# Patient Record
Sex: MALE | Race: WHITE
[De-identification: names, ages, dates, MRNs, and addresses within clinical notes are randomized per-mention and may not be internally consistent; named-entity substitution may affect disease eponyms.]

---

## 2018-01-09 ENCOUNTER — HOSPITAL ENCOUNTER (INPATIENT)
Dept: HOSPITAL 74 - YASAS | Age: 28
LOS: 5 days | Discharge: HOME | DRG: 773 | End: 2018-01-14
Attending: INTERNAL MEDICINE | Admitting: INTERNAL MEDICINE
Payer: COMMERCIAL

## 2018-01-09 VITALS — BODY MASS INDEX: 23.1 KG/M2

## 2018-01-09 DIAGNOSIS — F11.23: Primary | ICD-10-CM

## 2018-01-09 DIAGNOSIS — F17.210: ICD-10-CM

## 2018-01-09 DIAGNOSIS — F33.9: ICD-10-CM

## 2018-01-09 DIAGNOSIS — F14.20: ICD-10-CM

## 2018-01-09 DIAGNOSIS — F34.1: ICD-10-CM

## 2018-01-09 DIAGNOSIS — J45.22: ICD-10-CM

## 2018-01-09 LAB
APPEARANCE UR: CLEAR
BILIRUB UR STRIP.AUTO-MCNC: NEGATIVE MG/DL
COLOR UR: (no result)
KETONES UR QL STRIP: NEGATIVE
LEUKOCYTE ESTERASE UR QL STRIP.AUTO: NEGATIVE
NITRITE UR QL STRIP: NEGATIVE
PH UR: 5 [PH] (ref 5–8)
PROT UR QL STRIP: NEGATIVE
PROT UR QL STRIP: NEGATIVE
RBC # UR STRIP: NEGATIVE /UL
SP GR UR: 1.02 (ref 1–1.03)
UROBILINOGEN UR STRIP-MCNC: NEGATIVE MG/DL (ref 0.2–1)

## 2018-01-09 PROCEDURE — HZ2ZZZZ DETOXIFICATION SERVICES FOR SUBSTANCE ABUSE TREATMENT: ICD-10-PCS | Performed by: INTERNAL MEDICINE

## 2018-01-09 RX ADMIN — Medication SCH MG: at 22:30

## 2018-01-09 NOTE — HP
COWS





- Scale


Resting Pulse: 0= MA 80 or Below


Sweatin= Chills/Flushing


Restless Observation: 1= Difficult to Sit Still


Pupil Size: 0= Normal to Room Light


Bone or Joint Aches: 2= Severe Diffuse Aches


Runny Nose/ Eye Tearin= Runny Nose/Eyes


GI Upset > 30mins: 2= Nausea/Diarrhea


Tremor Observation: 2= Slight Tremor Visible


Yawning Observation: 2= >3x During Session


Anxiety or Irritability: 2=Irritable/Anxious


Goose Flesh Skin: 0=Smooth Skin


COWS Score: 14





Admission [facility]





- [facility]


Chief Complaint: 





WITHDRAWAL SX


Allergies/Adverse Reactions: 


 Allergies











Allergy/AdvReac Type Severity Reaction Status Date / Time


 


No Known Allergies Allergy   Verified 18 16:31











History of Present Illness: 





27 YEARS OLD MALE WITH LONG HISTORY OF HEROIN NICOTINE DEPENDENCE HAS ASTHMA 

AND DEPRESSION IS ADMITTED TO DETOX


Exam Limitations: No Limitations





- Ebola screening


Have you traveled outside of the country in the last 21 days: No (N)


Have you had contact with anyone from an Ebola affected area: No


Have you been sick,other than usual withdrawal symptoms: No


Do you have a fever: No





- Review of Systems


Constitutional: Loss of Appetite, Changes in sleep, Unintentional Wgt. Loss, 

Unexplained wgt Loss


EENT: reports: No Symptoms Reported


Respiratory: reports: No Symptoms reported


Cardiac: reports: No Symptoms Reported


GI: reports: Nausea, Poor Appetite, Poor Fluid Intake, Abdominal cramping


: reports: No Symptoms Reported


Musculoskeletal: reports: Back Pain, Joint Pain, Muscle Pain, Neck Pain


Integumentary: reports: Change in Color (IV HEROIN INNER ELBOWS)


Neuro: reports: Tremors


Endocrine: reports: No Symptoms Reported


Hematology: reports: No Symptoms Reported


Psychiatric: reports: Judgement Intact, Orientated x3, Anxious, Depressed


Other Systems: Reviewed and Negative





Patient History





- Patient Medical History


Hx Anemia: No


Hx Asthma: Yes


Hx Chronic Obstructive Pulmonary Disease (COPD): No


Hx Cancer: No


Hx Cardiac Disorders: No


Hx Congestive Heart Failure: No


Hx Hypertension: No


Hx Hypercholesterolemia: No


Hx Pacemaker: No


HX Cerebrovascular Accident: No


Hx Seizures: No


Hx Dementia: No


Hx Diabetes: No


Hx Gastrointestinal Disorders: No


Hx Liver Disease: No


Hx Genitourinary Disorders: No


Hx Sexually Transmitted Disorders: No


Hx Renal Disease (ESRD): No


Hx Thyroid Disease: No


Hx Human Immunodeficiency Virus (HIV): No


Hx Hepatitis C: No


Hx Depression: Yes


Hx Suicide Attempt: No


Hx Bipolar Disorder: No


Hx Schizophrenia: No





- Patient Surgical History


Past Surgical History: No





- PPD History


Previous Implant?: Yes


Documented Results: Negative w/o proof


Implanted On Prior [facility] Admission?: No


PPD to be Administered?: Yes





- Smoking Cessation


Smoking history: Current every day smoker


Have you smoked in the past 12 months: Yes


Aproximately how many cigarettes per day: 20


Cigars Per Day: 0


Hx Chewing Tobacco Use: No


Initiated information on smoking cessation: Yes


'Breaking Loose' booklet given: 18





- Substance & Tx. History


Hx Alcohol Use: Yes


Hx Substance Use: Yes


Substance Use Type: Cocaine, Heroin, Marijuana


Hx Substance Use Treatment: Yes (2016)





- Substances Abused


  ** Heroin


Route: Injection


Frequency: Daily


Amount used: 4-5 BAGS


Age of first use: 19


Date of Last Use: 18





  ** Cocaine


Route: Injection


Frequency: Daily


Amount used: 2-3 bags


Age of first use: 16


Date of Last Use: 18





  ** Alcohol


Route: Oral


Frequency: 3-6 times per week


Amount used: 8-9 [id number]


Age of first use: 12


Date of Last Use: 18





Admission Physical Exam [facility]





- Vital Signs


Vital Signs: 


 Vital Signs - 24 hr











  18





  14:29


 


Temperature 98.9 F


 


Pulse Rate 80


 


Respiratory 18





Rate 


 


Blood Pressure 100/60














- Physical


General Appearance: Yes: Appropriately Dressed, Mild Distress, Thin, Tremorous, 

Irritable, Sweating, Anxious


HEENTM: Yes: Hearing grossly Normal, Normal ENT Inspection, Normocephalic, 

Normal Voice


Respiratory: Yes: Chest Non-Tender, No Respiratory Distress, No Accessory 

Muscle Use, Wheezing


Neck: Yes: Supple, Trachea in good position


Breast: Yes: Breasts Symetrical


Cardiology: Yes: Regular Rhythm, Regular Rate, S1, S2


Abdominal: Yes: Non Tender, Flat, Increased Bowel Sounds


Genitourinary: Yes: Within Normal Limits


Back: Yes: Normal Inspection


Musculoskeletal: Yes: full range of Motion, Gait Steady, Back pain, Muscle Pain


Extremities: Yes: Normal Range of Motion, Non-Tender, Tremors


Neurological: Yes: Fully Oriented, Alert, Motor Strength 5/5, Normal Response, 

Depressed Affect


Integumentary: Yes: Warm, Track Marks


Lymphatic: Yes: Within Normal Limits





- Diagnostic


(1) Opioid dependence with withdrawal


Current Visit: Yes   Status: Acute   





(2) Nicotine dependence


Current Visit: Yes   Status: Acute   


Qualifiers: 


   Nicotine product type: cigarettes   Substance use status: in withdrawal   

Qualified Code(s): F17.213 - Nicotine dependence, cigarettes, with withdrawal   





(3) Asthma


Current Visit: Yes   Status: Chronic   


Qualifiers: 


   Asthma severity: mild   Asthma persistence: intermittent   Asthma 

complication type: with status asthmaticus   Qualified Code(s): J45.22 - Mild 

intermittent asthma with status asthmaticus   





(4) Depression (emotion)


Current Visit: Yes   Status: Suspected   


Qualifiers: 


   Depression Type: dysthymia   Qualified Code(s): F34.1 - Dysthymic disorder   





Cleared for Admission [facility]





- Detox or Rehab


[facility] Level of Care: Medically Managed


Detox Regimen/Protocol: Methadone





[facility] Breath Alcohol Content


Breath Alcohol Content: 0.097





Urine Drug Screen





- Results


Drug Screen Negative: No


Urine Drug Screen Results: THC-Marijuana, VIVEK-Cocaine, OPI-Opiates

## 2018-01-10 LAB
ALBUMIN SERPL-MCNC: 3.3 G/DL (ref 3.4–5)
ALP SERPL-CCNC: 54 U/L (ref 45–117)
ALT SERPL-CCNC: 57 U/L (ref 12–78)
ANION GAP SERPL CALC-SCNC: 8 MMOL/L (ref 8–16)
AST SERPL-CCNC: 37 U/L (ref 15–37)
BILIRUB SERPL-MCNC: 0.3 MG/DL (ref 0.2–1)
BUN SERPL-MCNC: 13 MG/DL (ref 7–18)
CALCIUM SERPL-MCNC: 8.4 MG/DL (ref 8.5–10.1)
CHLORIDE SERPL-SCNC: 105 MMOL/L (ref 98–107)
CO2 SERPL-SCNC: 28 MMOL/L (ref 21–32)
CREAT SERPL-MCNC: 0.8 MG/DL (ref 0.7–1.3)
DEPRECATED RDW RBC AUTO: 13.1 % (ref 11.9–15.9)
GLUCOSE SERPL-MCNC: 79 MG/DL (ref 74–106)
HCT VFR BLD CALC: 42.5 % (ref 35.4–49)
HGB BLD-MCNC: 13.9 GM/DL (ref 11.7–16.9)
MCH RBC QN AUTO: 29.9 PG (ref 25.7–33.7)
MCHC RBC AUTO-ENTMCNC: 32.7 G/DL (ref 32–35.9)
MCV RBC: 91.5 FL (ref 80–96)
PLATELET # BLD AUTO: 264 K/MM3 (ref 134–434)
PMV BLD: 7.9 FL (ref 7.5–11.1)
POTASSIUM SERPLBLD-SCNC: 3.9 MMOL/L (ref 3.5–5.1)
PROT SERPL-MCNC: 6.3 G/DL (ref 6.4–8.2)
RBC # BLD AUTO: 4.64 M/MM3 (ref 4–5.6)
SODIUM SERPL-SCNC: 141 MMOL/L (ref 136–145)
WBC # BLD AUTO: 5.3 K/MM3 (ref 4–10)

## 2018-01-10 RX ADMIN — Medication SCH MG: at 22:37

## 2018-01-10 RX ADMIN — NICOTINE SCH MG: 21 PATCH TRANSDERMAL at 10:16

## 2018-01-10 RX ADMIN — Medication SCH TAB: at 10:14

## 2018-01-10 NOTE — EKG
Test Reason : 

Blood Pressure : ***/*** mmHG

Vent. Rate : 065 BPM     Atrial Rate : 065 BPM

   P-R Int : 142 ms          QRS Dur : 090 ms

    QT Int : 400 ms       P-R-T Axes : 048 051 055 degrees

   QTc Int : 416 ms

 

NORMAL SINUS RHYTHM

NORMAL ECG

NO PREVIOUS ECGS AVAILABLE

Confirmed by OWEN DUPREE, FANTASMA ([id number]) on 1/10/2018 9:38:24 AM

 

Referred By:             Confirmed By:FANTASMA WEAVER MD

## 2018-01-10 NOTE — CONSULT
[facility] Psychiatric Consult





- Data


Date of interview: 01/10/18


Admission source: [facility]


Identifying data: Pt. is a 27 year old male, single, without kids, and 

currently working. This is patient's first admission to [facility]. Pt. admitted 

to  for heroin dependence.


Substance Abuse History: Following information confirmed with Mr. Dee:  

Smoking Cessation.  Smoking history: Current every day smoker.  Have you smoked 

in the past 12 months: Yes.  Aproximately how many cigarettes per day: 20.  

Cigars Per Day: 0.  Hx Chewing Tobacco Use: No.  Initiated information on 

smoking cessation: Yes.  'Breaking Loose' booklet given: 01/09/18.  - Substance 

& Tx. History.  Hx Alcohol Use: Yes.  Hx Substance Use: Yes.  Substance Use Type

: Cocaine, Heroin, Marijuana.  Hx Substance Use Treatment: Yes (2016).  - 

Substances Abused.  ** Heroin.  Route: Injection.  Frequency: Daily.  Amount 

used: 4-5 BAGS.  Age of first use: 19.  Date of Last Use: 01/09/18.  ** 

Cocaine.  Route: Injection.  Frequency: Daily.  Amount used: 2-3 bags.  Age of 

first use: 16.  Date of Last Use: 01/08/18.  ** Alcohol.  Route: Oral.  

Frequency: 3-6 times per week.  Amount used: 8-9 [id number].  Age of first use: 

12.  Date of Last Use: 01/09/18


Medical History: Asthma.


Psychiatric History: Pt. presented as sad and depressed. Pt. assessed by 

psychiatric nurse practitioner and asked if he was endorsing any thoughts to 

hurt himself. Pt. hesitant and tearful. Then stated, " I''m having suicidal 

ideation." Pt. denies having a plan but continues to endorse suicidal ideation. 

Pt. then stated, " "What am i going to do here. Hang myself?  i'm not going to 

do anything but i am having suicidal ideation." Pt. reports having difficulty 

with soberity and his depression. Stating to writer, " I just need to stop 

using drugs but i can't. I get depressed and i begin to use again."  Due to 

active suicidal ideation patient will be placed on 1:1 observation.  Pt. denies 

h/o psychiatric hospitalizations. Reports one suicide attempt at 22 years of 

age via overdose of heroin. Pt. reports seeing a psychiatrist while in rehab at 

[facility] in [place] ( February 2017) and was started on wellbutrin. States he 

last took wellbutrin in april of 2017. Pt. requesting to restart wellbutrin. 

Pt. reports a diagnosis of major depressive disorder.


Physical/Sexual Abuse/Trauma History: Denies.





Mental Status Exam





- Mental Status Exam


Alert and Oriented to: Time, Place, Person


Cognitive Function: Good


Patient Appearance: Well Groomed


Mood: Depressed, Sad


Affect: Mood Congruent


Patient Behavior: Crying (Pt. tearful. ), Cooperative


Speech Pattern: Clear


Voice Loudness: Normal


Thought Process: Goal Oriented


Thought Disorder: Not Present


Hallucinations: Denies


Suicidal Ideation: Current (Pt. currently endorsing suicidal ideation but has 

no plan. )


Homicidal Ideation: Denies


Insight/Judgement: Poor


Sleep: Poorly


Appetite: Fair


Muscle strength/Tone: Normal


Gait/Station: Normal





Psychiatric Findings





- Problem List (Axis 1, 2,3)


(1) Opioid dependence with withdrawal


Current Visit: Yes   Status: Acute   





(2) MDD (major depressive disorder)


Current Visit: Yes   Status: Acute   Comment: Pt. is depressed, affect is 

constricted,  and self reports a diagnosis of depression.   





(3) Nicotine dependence


Current Visit: Yes   Status: Acute   


Qualifiers: 


   Nicotine product type: cigarettes   Substance use status: in withdrawal   

Qualified Code(s): F17.213 - Nicotine dependence, cigarettes, with withdrawal   





(4) Cocaine dependence


Current Visit: Yes   Status: Acute   





- Initial Treatment Plan


Initial Treatment Plan: Psychoeducation provided. Detoxification in progress. 

Pt. reported to writer that he is currently endorsing active suicidal ideation. 

Denies having a specific plan. Review of patient's history consist of one 

suicide attempt via overdose of heroin at the age of 22 which is indicative of 

impulsive behavior. Pt. is able to mention his family members as his protective 

factors. At this time patient can be managed in this setting and as a 

precaution patient is placed on 1:1 observation for safety.  Wellbutrin 150XL 

po daily +Seroquel 50mg qhs  for insomnia to be ordered. Verbal consent given. 

Benefits and side effects discussed. Will continue to monitor.

## 2018-01-10 NOTE — PN
[facility] COWS





- Scale


Resting Pulse: 1= OR 


Sweatin= Chills/Flushing


Restless Observation: 3= Extraneous Movement


Pupil Size: 2= Moderately Dilated


Bone or Joint Aches: 4=Acute Joint/Muscle Pain


Runny Nose/ Eye Tearin= None


GI Upset > 30mins: 1= Stomach Cramp


Tremor Observation of Outstretched Hands: 1= Tremor Felt, Not Seen


Yawning Observation: 1= 1-2x During Session


Anxiety or Irritability: 2=Irritable/Anxious


Goose Flesh Skin: 0=Smooth Skin


COWS Score: 16





[facility] Progress Note (SOAP)


Subjective: 





ANXIETY,SWEATS,TREMORS,FATIGUE.


Objective: 





01/10/18 13:15


 Vital Signs











Temperature  98.5 F   01/10/18 13:03


 


Pulse Rate  91 H  01/10/18 13:03


 


Respiratory Rate  18   01/10/18 13:03


 


Blood Pressure  93/65   01/10/18 13:03


 


O2 Sat by Pulse Oximetry (%)      








 Laboratory Last Values











WBC  5.3 K/mm3 (4.0-10.0)   01/10/18  07:00    


 


RBC  4.64 M/mm3 (4.00-5.60)   01/10/18  07:00    


 


Hgb  13.9 GM/dL (11.7-16.9)   01/10/18  07:00    


 


Hct  42.5 % (35.4-49)   01/10/18  07:00    


 


MCV  91.5 fl (80-96)   01/10/18  07:00    


 


MCH  29.9 pg (25.7-33.7)   01/10/18  07:00    


 


MCHC  32.7 g/dl (32.0-35.9)   01/10/18  07:00    


 


RDW  13.1 % (11.9-15.9)   01/10/18  07:00    


 


Plt Count  264 K/MM3 (134-434)   01/10/18  07:00    


 


MPV  7.9 fl (7.5-11.1)   01/10/18  07:00    


 


Sodium  141 mmol/L (136-145)   01/10/18  07:00    


 


Potassium  3.9 mmol/L (3.5-5.1)   01/10/18  07:00    


 


Chloride  105 mmol/L ()   01/10/18  07:00    


 


Carbon Dioxide  28 mmol/L (21-32)   01/10/18  07:00    


 


Anion Gap  8  (8-16)   01/10/18  07:00    


 


BUN  13 mg/dL (7-18)   01/10/18  07:00    


 


Creatinine  0.8 mg/dL (0.7-1.3)   01/10/18  07:00    


 


Creat Clearance w eGFR  > 60  (>60)   01/10/18  07:00    


 


Random Glucose  79 mg/dL ()   01/10/18  07:00    


 


Calcium  8.4 mg/dL (8.5-10.1)  L  01/10/18  07:00    


 


Total Bilirubin  0.3 mg/dL (0.2-1.0)   01/10/18  07:00    


 


AST  37 U/L (15-37)   01/10/18  07:00    


 


ALT  57 U/L (12-78)   01/10/18  07:00    


 


Alkaline Phosphatase  54 U/L ()   01/10/18  07:00    


 


Total Protein  6.3 g/dl (6.4-8.2)  L  01/10/18  07:00    


 


Albumin  3.3 g/dl (3.4-5.0)  L  01/10/18  07:00    


 


Urine Color  Dkyellow   18  21:07    


 


Urine Appearance  Clear   18  21:07    


 


Urine pH  5.0  (5.0-8.0)   18  21:07    


 


Ur Specific Gravity  1.020  (1.001-1.035)   18  21:07    


 


Urine Protein  Negative  (NEGATIVE)   18  21:07    


 


Urine Glucose (UA)  Negative  (NEGATIVE)   18  21:07    


 


Urine Ketones  Negative  (NEGATIVE)   18  21:07    


 


Urine Blood  Negative  (NEGATIVE)   18  21:07    


 


Urine Nitrite  Negative  (NEGATIVE)   18  21:07    


 


Urine Bilirubin  Negative  (NEGATIVE)   18  21:07    


 


Urine Urobilinogen  Negative mg/dL (0.2-1.0)   18  21:07    


 


Ur Leukocyte Esterase  Negative  (NEGATIVE)   18  21:07    











Assessment: 





01/10/18 13:15


WITHDRAWAL SX


Plan: 





CONTINUE DETOX

## 2018-01-11 RX ADMIN — Medication SCH MG: at 22:20

## 2018-01-11 RX ADMIN — NICOTINE SCH MG: 21 PATCH TRANSDERMAL at 10:29

## 2018-01-11 RX ADMIN — Medication SCH TAB: at 10:29

## 2018-01-11 NOTE — PN
Psychiatric Progress Note


Vital Signs: 


 Vital Signs











 Period  Temp  Pulse  Resp  BP Sys/Felix  Pulse Ox


 


 Last 24 Hr  96.1 F-98.5 F  52-91  16-18  87-98/55-65  











Date of Session: 01/11/18


Chief Complaint:: "Reevaluation of CO patient."


HPI: Pt. is a 27 year old male with a h/o heroin dependence. Pt. was placed on 1

:1 observation for sucidial ideation without a plan on 1/10/2018


ROS: Unremarkable


Current Medications: 


Active Medications











Generic Name Dose Route Start Last Admin





  Trade Name Freq  PRN Reason Stop Dose Admin


 


Acetaminophen  650 mg  01/09/18 17:29  





  Tylenol -  PO   





  Q4H PRN   





  FEVER OR PAIN   


 


Al Hydroxide/Mg Hydroxide  30 ml  01/09/18 17:29  





  Mylanta Oral Suspension -  PO   





  Q6H PRN   





  DYSPEPSIA   


 


Albuterol Sulfate  2 puff  01/09/18 17:31  





  Ventolin Hfa Inhaler -  IH   





  Q4H PRN   





  ASTHMA   


 


Bupropion HCl  150 mg  01/11/18 10:00  01/11/18 10:29





  Wellbutrin Xl -  PO   150 mg





  DAILY JANE   Administration


 


Diazepam  10 mg  01/09/18 17:29  01/11/18 09:29





  Valium -  PO  01/12/18 17:28  10 mg





  Q4H PRN   Administration





  WITHDRAWAL(CONT SUBST)   


 


Eucalyptus/Menthol/Phenol/Sorbitol  1 each  01/09/18 17:29  





  Cepastat Lozenge -  MM   





  Q4H PRN   





  SORE THROAT   


 


Guaifenesin  10 ml  01/09/18 17:29  





  Robitussin Dm -  PO   





  Q6H PRN   





  COUGH   


 


Ibuprofen  400 mg  01/09/18 17:29  





  Motrin -  PO   





  Q6H PRN   





  SEVERE PAIN   


 


Loperamide HCl  4 mg  01/09/18 17:29  





  Imodium -  PO   





  Q6H PRN   





  DIARRHEA   


 


Magnesium Citrate  300 ml  01/09/18 17:29  





  Citroma -  PO   





  Q48H PRN   





  CONSTIPATION   


 


Magnesium Hydroxide  30 ml  01/09/18 17:29  





  Milk Of Magnesia -  PO   





  DAILY PRN   





  CONSTIPATION   


 


Methadone HCl  5 mg  01/14/18 06:00  





  Dolophine -  PO  01/14/18 06:01  





  ONCE@0600 ONE   


 


Methadone HCl  15 mg  01/12/18 10:00  





  Dolophine -  PO  01/12/18 10:01  





  ONCE ONE   


 


Methadone HCl  10 mg  01/13/18 10:00  





  Dolophine -  PO  01/13/18 10:01  





  ONCE ONE   


 


Nicotine  21 mg  01/10/18 10:00  01/11/18 10:29





  Nicoderm Patch -  TD   21 mg





  DAILY JANE   Administration


 


Nicotine Polacrilex  4 mg  01/09/18 17:29  





  Nicorette Gum -  BC   





  Q2H PRN   





  NICOTINE REPLACEMENT RX   


 


Prenatal Multivit/Folic Acid/Iron  1 tab  01/10/18 10:00  01/11/18 10:29





  Prenatal Vitamins ([facility]) -  PO   1 tab





  DAILY JANE   Administration


 


Pseudoephedrine/Triprolidine  1 combo  01/09/18 17:29  





  Actifed -  PO   





  TID PRN   





  NASAL CONGESTION   


 


Quetiapine Fumarate  50 mg  01/10/18 22:00  01/10/18 22:37





  Seroquel -  PO   50 mg





  [facility] JANE   Administration


 


Thiamine HCl  100 mg  01/09/18 22:00  01/10/18 22:37





  Vitamin B1 -  PO   100 mg





  [facility] JANE   Administration











Current Side Effect: No


Lab tests ordered: No


Lab tests reviewed: Yes


Provider note:: Pt. placed on 1:1 observation on 1/10/2018 for suicidal 

ideation without a specific plan. Pt. re evaulated on 1/11/2018. Pt. currently 

denies suicidal ideation. States the suicidal ideation was precipitated by 

relapse of heroin usuage and admitting himself into detox. Pt. able to mention 

protective factors: his mother, father, sister and brother. Pt is future 

oriented. Pt. is currently working as a  and also works as a 

. Pt. reports an active social life, a place to live, and is 

currently dating someone. Pt's states he has been attending AA consectively for 

the previous 6-7 years. Reports wanting to make an appointment to see a 

therapist and psychiatrist daily. Pt. was started on wellbutrin 150XL. Pt. made 

aware of the benefits and side effects. Pt. denies any thoughts or urges to 

hurt himself and others at this time. Also states he can come to staff if he 

begins to have suicidal ideation. 1:1 observation discontinued.


Total face to face time:: 35





Mental Status Exam





- Mental Status Exam


Alert and Oriented to: Time, Place, Person


Cognitive Function: Good


Patient Appearance: Well Groomed


Mood: Hopeful


Affect: Mood Congruent


Patient Behavior: Appropriate, Cooperative


Speech Pattern: Clear, Appropriate


Voice Loudness: Normal


Thought Process: Intact, Goal Oriented


Hallucinations: Denies


Suicidal Ideation: Denies


Homicidal Ideation: Denies


Insight/Judgement: Poor


Sleep: Fair


Appetite: Fair


Muscle strength/Tone: Normal


Gait/Station: Normal





Psychiatric Treatment Plan





- Problem List


(1) Opioid dependence with withdrawal


Current Visit: Yes   





(2) MDD (major depressive disorder)


Current Visit: Yes   Comment: self reports a diagnosis of depression.   





(3) Nicotine dependence


Current Visit: Yes   


Qualifiers: 


   Nicotine product type: cigarettes   Substance use status: in withdrawal   

Qualified Code(s): F17.213 - Nicotine dependence, cigarettes, with withdrawal   





(4) Cocaine dependence


Current Visit: Yes

## 2018-01-11 NOTE — PN
[facility] COWS





- Scale


Resting Pulse: 0= NE 80 or Below


Sweatin=Flushed/Facial Moisture


Restless Observation: 0= Sits Still


Pupil Size: 2= Moderately Dilated


Bone or Joint Aches: 2= Severe Diffuse Aches


Runny Nose/ Eye Tearin= Nasal Congestion


GI Upset > 30mins: 0= None


Tremor Observation of Outstretched Hands: 2= Slight Tremor Visible


Yawning Observation: 2= >3x During Session


Anxiety or Irritability: 2=Irritable/Anxious


Goose Flesh Skin: 0=Smooth Skin


COWS Score: 13





[facility] Progress Note (SOAP)


Subjective: 





ANXIETY,HOT/COLD SWEATS. 


Objective: 





18 11:42


 Vital Signs











Temperature  96.4 F L  18 09:31


 


Pulse Rate  61   18 09:31


 


Respiratory Rate  16   18 09:31


 


Blood Pressure  87/56   18 09:31


 


O2 Sat by Pulse Oximetry (%)      








 Laboratory Last Values











WBC  5.3 K/mm3 (4.0-10.0)   01/10/18  07:00    


 


RBC  4.64 M/mm3 (4.00-5.60)   01/10/18  07:00    


 


Hgb  13.9 GM/dL (11.7-16.9)   01/10/18  07:00    


 


Hct  42.5 % (35.4-49)   01/10/18  07:00    


 


MCV  91.5 fl (80-96)   01/10/18  07:00    


 


MCH  29.9 pg (25.7-33.7)   01/10/18  07:00    


 


MCHC  32.7 g/dl (32.0-35.9)   01/10/18  07:00    


 


RDW  13.1 % (11.9-15.9)   01/10/18  07:00    


 


Plt Count  264 K/MM3 (134-434)   01/10/18  07:00    


 


MPV  7.9 fl (7.5-11.1)   01/10/18  07:00    


 


Sodium  141 mmol/L (136-145)   01/10/18  07:00    


 


Potassium  3.9 mmol/L (3.5-5.1)   01/10/18  07:00    


 


Chloride  105 mmol/L ()   01/10/18  07:00    


 


Carbon Dioxide  28 mmol/L (21-32)   01/10/18  07:00    


 


Anion Gap  8  (8-16)   01/10/18  07:00    


 


BUN  13 mg/dL (7-18)   01/10/18  07:00    


 


Creatinine  0.8 mg/dL (0.7-1.3)   01/10/18  07:00    


 


Creat Clearance w eGFR  > 60  (>60)   01/10/18  07:00    


 


Random Glucose  79 mg/dL ()   01/10/18  07:00    


 


Calcium  8.4 mg/dL (8.5-10.1)  L  01/10/18  07:00    


 


Total Bilirubin  0.3 mg/dL (0.2-1.0)   01/10/18  07:00    


 


AST  37 U/L (15-37)   01/10/18  07:00    


 


ALT  57 U/L (12-78)   01/10/18  07:00    


 


Alkaline Phosphatase  54 U/L ()   01/10/18  07:00    


 


Total Protein  6.3 g/dl (6.4-8.2)  L  01/10/18  07:00    


 


Albumin  3.3 g/dl (3.4-5.0)  L  01/10/18  07:00    


 


Urine Color  Dkyellow   18  21:07    


 


Urine Appearance  Clear   18  21:07    


 


Urine pH  5.0  (5.0-8.0)   18  21:07    


 


Ur Specific Gravity  1.020  (1.001-1.035)   18  21:07    


 


Urine Protein  Negative  (NEGATIVE)   18  21:07    


 


Urine Glucose (UA)  Negative  (NEGATIVE)   18  21:07    


 


Urine Ketones  Negative  (NEGATIVE)   18  21:07    


 


Urine Blood  Negative  (NEGATIVE)   18  21:07    


 


Urine Nitrite  Negative  (NEGATIVE)   18  21:07    


 


Urine Bilirubin  Negative  (NEGATIVE)   18  21:07    


 


Urine Urobilinogen  Negative mg/dL (0.2-1.0)   18  21:07    


 


Ur Leukocyte Esterase  Negative  (NEGATIVE)   18  21:07    


 


RPR Titer  Nonreactive  (NONREACTIVE)   01/10/18  07:00    


 


Hepatitis C Antibody  <0.1 s/co ratio (0.0-0.9)   18  07:00    


 


HIV 1&2 Antibody Screen  Negative   01/10/18  07:00    


 


HIV P24 Antigen  Negative   01/10/18  07:00    











Assessment: 





18 11:42


WITHDRAWAL SX


Plan: 





CONTINUE DETOX


PT MAINTAINED ON 1:1

## 2018-01-12 RX ADMIN — Medication SCH MG: at 22:23

## 2018-01-12 RX ADMIN — Medication SCH TAB: at 10:32

## 2018-01-12 RX ADMIN — NICOTINE SCH MG: 21 PATCH TRANSDERMAL at 10:33

## 2018-01-12 NOTE — PN
[facility] Progress Note (SOAP)


Subjective: 





ALERT O X 3. CALM AND RESTING IN BED. STATES FEELS BETTER BUT VERY SLIGHT HOT/

COLD CHILLS EARLIER.


Objective: 





01/12/18 11:32


 Vital Signs











Temperature  96.1 F L  01/12/18 10:06


 


Pulse Rate  83   01/12/18 10:06


 


Respiratory Rate  18   01/12/18 10:06


 


Blood Pressure  96/67   01/12/18 10:06


 


O2 Sat by Pulse Oximetry (%)      








 Laboratory Last Values











WBC  5.3 K/mm3 (4.0-10.0)   01/10/18  07:00    


 


RBC  4.64 M/mm3 (4.00-5.60)   01/10/18  07:00    


 


Hgb  13.9 GM/dL (11.7-16.9)   01/10/18  07:00    


 


Hct  42.5 % (35.4-49)   01/10/18  07:00    


 


MCV  91.5 fl (80-96)   01/10/18  07:00    


 


MCH  29.9 pg (25.7-33.7)   01/10/18  07:00    


 


MCHC  32.7 g/dl (32.0-35.9)   01/10/18  07:00    


 


RDW  13.1 % (11.9-15.9)   01/10/18  07:00    


 


Plt Count  264 K/MM3 (134-434)   01/10/18  07:00    


 


MPV  7.9 fl (7.5-11.1)   01/10/18  07:00    


 


Sodium  141 mmol/L (136-145)   01/10/18  07:00    


 


Potassium  3.9 mmol/L (3.5-5.1)   01/10/18  07:00    


 


Chloride  105 mmol/L ()   01/10/18  07:00    


 


Carbon Dioxide  28 mmol/L (21-32)   01/10/18  07:00    


 


Anion Gap  8  (8-16)   01/10/18  07:00    


 


BUN  13 mg/dL (7-18)   01/10/18  07:00    


 


Creatinine  0.8 mg/dL (0.7-1.3)   01/10/18  07:00    


 


Creat Clearance w eGFR  > 60  (>60)   01/10/18  07:00    


 


Random Glucose  79 mg/dL ()   01/10/18  07:00    


 


Calcium  8.4 mg/dL (8.5-10.1)  L  01/10/18  07:00    


 


Total Bilirubin  0.3 mg/dL (0.2-1.0)   01/10/18  07:00    


 


AST  37 U/L (15-37)   01/10/18  07:00    


 


ALT  57 U/L (12-78)   01/10/18  07:00    


 


Alkaline Phosphatase  54 U/L ()   01/10/18  07:00    


 


Total Protein  6.3 g/dl (6.4-8.2)  L  01/10/18  07:00    


 


Albumin  3.3 g/dl (3.4-5.0)  L  01/10/18  07:00    


 


Urine Color  Dkyellow   01/09/18  21:07    


 


Urine Appearance  Clear   01/09/18  21:07    


 


Urine pH  5.0  (5.0-8.0)   01/09/18  21:07    


 


Ur Specific Gravity  1.020  (1.001-1.035)   01/09/18  21:07    


 


Urine Protein  Negative  (NEGATIVE)   01/09/18  21:07    


 


Urine Glucose (UA)  Negative  (NEGATIVE)   01/09/18  21:07    


 


Urine Ketones  Negative  (NEGATIVE)   01/09/18  21:07    


 


Urine Blood  Negative  (NEGATIVE)   01/09/18  21:07    


 


Urine Nitrite  Negative  (NEGATIVE)   01/09/18  21:07    


 


Urine Bilirubin  Negative  (NEGATIVE)   01/09/18  21:07    


 


Urine Urobilinogen  Negative mg/dL (0.2-1.0)   01/09/18  21:07    


 


Ur Leukocyte Esterase  Negative  (NEGATIVE)   01/09/18  21:07    


 


RPR Titer  Nonreactive  (NONREACTIVE)   01/10/18  07:00    


 


Hepatitis C Antibody  <0.1 s/co ratio (0.0-0.9)   01/09/18  07:00    


 


HIV 1&2 Antibody Screen  Negative   01/10/18  07:00    


 


HIV P24 Antigen  Negative   01/10/18  07:00    











Assessment: 





01/12/18 11:32


WITHDRAWAL SX





Plan: 





CONTINUE DETOX


INCREASE PO FLUIDS ENCOURAGED.

## 2018-01-13 RX ADMIN — Medication SCH MG: at 22:30

## 2018-01-13 RX ADMIN — Medication SCH TAB: at 10:30

## 2018-01-13 RX ADMIN — NICOTINE SCH MG: 21 PATCH TRANSDERMAL at 10:30

## 2018-01-13 NOTE — PN
[facility] Progress Note (SOAP)


Subjective: 





Mild muscle aches


Objective: 





01/13/18 11:29


 Vital Signs - 8 hr











  01/13/18 01/13/18





  06:29 09:13


 


Temperature 96.9 F L 97.6 F


 


Pulse Rate 67 93 H


 


Respiratory 18 20





Rate  


 


Blood Pressure 102/55 92/57








 Laboratory Last Values











WBC  5.3 K/mm3 (4.0-10.0)   01/10/18  07:00    


 


RBC  4.64 M/mm3 (4.00-5.60)   01/10/18  07:00    


 


Hgb  13.9 GM/dL (11.7-16.9)   01/10/18  07:00    


 


Hct  42.5 % (35.4-49)   01/10/18  07:00    


 


MCV  91.5 fl (80-96)   01/10/18  07:00    


 


MCH  29.9 pg (25.7-33.7)   01/10/18  07:00    


 


MCHC  32.7 g/dl (32.0-35.9)   01/10/18  07:00    


 


RDW  13.1 % (11.9-15.9)   01/10/18  07:00    


 


Plt Count  264 K/MM3 (134-434)   01/10/18  07:00    


 


MPV  7.9 fl (7.5-11.1)   01/10/18  07:00    


 


Sodium  141 mmol/L (136-145)   01/10/18  07:00    


 


Potassium  3.9 mmol/L (3.5-5.1)   01/10/18  07:00    


 


Chloride  105 mmol/L ()   01/10/18  07:00    


 


Carbon Dioxide  28 mmol/L (21-32)   01/10/18  07:00    


 


Anion Gap  8  (8-16)   01/10/18  07:00    


 


BUN  13 mg/dL (7-18)   01/10/18  07:00    


 


Creatinine  0.8 mg/dL (0.7-1.3)   01/10/18  07:00    


 


Creat Clearance w eGFR  > 60  (>60)   01/10/18  07:00    


 


Random Glucose  79 mg/dL ()   01/10/18  07:00    


 


Calcium  8.4 mg/dL (8.5-10.1)  L  01/10/18  07:00    


 


Total Bilirubin  0.3 mg/dL (0.2-1.0)   01/10/18  07:00    


 


AST  37 U/L (15-37)   01/10/18  07:00    


 


ALT  57 U/L (12-78)   01/10/18  07:00    


 


Alkaline Phosphatase  54 U/L ()   01/10/18  07:00    


 


Total Protein  6.3 g/dl (6.4-8.2)  L  01/10/18  07:00    


 


Albumin  3.3 g/dl (3.4-5.0)  L  01/10/18  07:00    


 


Urine Color  Dkyellow   01/09/18  21:07    


 


Urine Appearance  Clear   01/09/18  21:07    


 


Urine pH  5.0  (5.0-8.0)   01/09/18  21:07    


 


Ur Specific Gravity  1.020  (1.001-1.035)   01/09/18  21:07    


 


Urine Protein  Negative  (NEGATIVE)   01/09/18  21:07    


 


Urine Glucose (UA)  Negative  (NEGATIVE)   01/09/18  21:07    


 


Urine Ketones  Negative  (NEGATIVE)   01/09/18  21:07    


 


Urine Blood  Negative  (NEGATIVE)   01/09/18  21:07    


 


Urine Nitrite  Negative  (NEGATIVE)   01/09/18  21:07    


 


Urine Bilirubin  Negative  (NEGATIVE)   01/09/18  21:07    


 


Urine Urobilinogen  Negative mg/dL (0.2-1.0)   01/09/18  21:07    


 


Ur Leukocyte Esterase  Negative  (NEGATIVE)   01/09/18  21:07    


 


RPR Titer  Nonreactive  (NONREACTIVE)   01/10/18  07:00    


 


Hepatitis C Antibody  <0.1 s/co ratio (0.0-0.9)   01/09/18  07:00    


 


HIV 1&2 Antibody Screen  Negative   01/10/18  07:00    


 


HIV P24 Antigen  Negative   01/10/18  07:00    








Labs noted


Assessment: 





01/13/18 11:29


Resolving withdrawal sx


Plan: 





Continue detox

## 2018-01-14 VITALS — TEMPERATURE: 97.4 F | HEART RATE: 85 BPM | SYSTOLIC BLOOD PRESSURE: 99 MMHG | DIASTOLIC BLOOD PRESSURE: 59 MMHG

## 2018-01-14 RX ADMIN — NICOTINE SCH: 21 PATCH TRANSDERMAL at 09:52

## 2018-01-14 RX ADMIN — Medication SCH TAB: at 09:52

## 2018-01-14 NOTE — DS
[facility] Detox Discharge Summary


Admission Date: 


01/09/18





Discharge Date: 01/14/18





- History


Pertinent Past History: 





Asthma





- Physical Exam Results


Vital Signs: 


 Vital Signs











Temperature  97.4 F L  01/14/18 09:48


 


Pulse Rate  85   01/14/18 09:48


 


Respiratory Rate  16   01/14/18 09:48


 


Blood Pressure  99/59   01/14/18 09:48


 


O2 Sat by Pulse Oximetry (%)      











Pertinent Admission Physical Exam Findings: 





withdrawal symptoms


 Laboratory Last Values











WBC  5.3 K/mm3 (4.0-10.0)   01/10/18  07:00    


 


RBC  4.64 M/mm3 (4.00-5.60)   01/10/18  07:00    


 


Hgb  13.9 GM/dL (11.7-16.9)   01/10/18  07:00    


 


Hct  42.5 % (35.4-49)   01/10/18  07:00    


 


MCV  91.5 fl (80-96)   01/10/18  07:00    


 


MCH  29.9 pg (25.7-33.7)   01/10/18  07:00    


 


MCHC  32.7 g/dl (32.0-35.9)   01/10/18  07:00    


 


RDW  13.1 % (11.9-15.9)   01/10/18  07:00    


 


Plt Count  264 K/MM3 (134-434)   01/10/18  07:00    


 


MPV  7.9 fl (7.5-11.1)   01/10/18  07:00    


 


Sodium  141 mmol/L (136-145)   01/10/18  07:00    


 


Potassium  3.9 mmol/L (3.5-5.1)   01/10/18  07:00    


 


Chloride  105 mmol/L ()   01/10/18  07:00    


 


Carbon Dioxide  28 mmol/L (21-32)   01/10/18  07:00    


 


Anion Gap  8  (8-16)   01/10/18  07:00    


 


BUN  13 mg/dL (7-18)   01/10/18  07:00    


 


Creatinine  0.8 mg/dL (0.7-1.3)   01/10/18  07:00    


 


Creat Clearance w eGFR  > 60  (>60)   01/10/18  07:00    


 


Random Glucose  79 mg/dL ()   01/10/18  07:00    


 


Calcium  8.4 mg/dL (8.5-10.1)  L  01/10/18  07:00    


 


Total Bilirubin  0.3 mg/dL (0.2-1.0)   01/10/18  07:00    


 


AST  37 U/L (15-37)   01/10/18  07:00    


 


ALT  57 U/L (12-78)   01/10/18  07:00    


 


Alkaline Phosphatase  54 U/L ()   01/10/18  07:00    


 


Total Protein  6.3 g/dl (6.4-8.2)  L  01/10/18  07:00    


 


Albumin  3.3 g/dl (3.4-5.0)  L  01/10/18  07:00    


 


Urine Color  Dkyellow   01/09/18  21:07    


 


Urine Appearance  Clear   01/09/18  21:07    


 


Urine pH  5.0  (5.0-8.0)   01/09/18  21:07    


 


Ur Specific Gravity  1.020  (1.001-1.035)   01/09/18  21:07    


 


Urine Protein  Negative  (NEGATIVE)   01/09/18  21:07    


 


Urine Glucose (UA)  Negative  (NEGATIVE)   01/09/18  21:07    


 


Urine Ketones  Negative  (NEGATIVE)   01/09/18  21:07    


 


Urine Blood  Negative  (NEGATIVE)   01/09/18  21:07    


 


Urine Nitrite  Negative  (NEGATIVE)   01/09/18  21:07    


 


Urine Bilirubin  Negative  (NEGATIVE)   01/09/18  21:07    


 


Urine Urobilinogen  Negative mg/dL (0.2-1.0)   01/09/18  21:07    


 


Ur Leukocyte Esterase  Negative  (NEGATIVE)   01/09/18  21:07    


 


RPR Titer  Nonreactive  (NONREACTIVE)   01/10/18  07:00    


 


Hepatitis C Antibody  <0.1 s/co ratio (0.0-0.9)   01/09/18  07:00    


 


HIV 1&2 Antibody Screen  Negative   01/10/18  07:00    


 


HIV P24 Antigen  Negative   01/10/18  07:00    














- Treatment


Hospital Course: Detox Protocol Followed, Detoxed Safely, Responded well, 

Discharged Condition Good, Rehab Referral Accepted


Patient has Accepted a Rehab Referral to: [facility] 12 step program 





- Medication


Discharge Medications: 


Ambulatory Orders





Albuterol Sulfate Inhaler - [Ventolin HFA Inhaler -] 2 inh PO Q4H PRN 30 Days #

1 inhaler 01/14/18 


Bupropion HCl [Wellbutrin Xl -] 150 mg PO DAILY #30 tab.sr.24h 01/14/18 


Quetiapine Fumarate [Seroquel -] 50 mg PO HS #30 tablet 01/14/18 











- Diagnosis


(1) Cocaine dependence


Current Visit: Yes   Status: Acute   





(2) Nicotine dependence


Current Visit: Yes   Status: Acute   


Qualifiers: 


   Nicotine product type: cigarettes   Substance use status: uncomplicated   

Qualified Code(s): F17.210 - Nicotine dependence, cigarettes, uncomplicated   





(3) Opioid dependence with withdrawal


Current Visit: Yes   Status: Acute   





(4) Asthma


Current Visit: Yes   Status: Chronic   


Qualifiers: 


   Asthma severity: mild   Asthma persistence: intermittent   Asthma 

complication type: with status asthmaticus   Qualified Code(s): J45.22 - Mild 

intermittent asthma with status asthmaticus   





- AMA


Did Patient Leave Against Medical Advice: No